# Patient Record
Sex: FEMALE | Race: BLACK OR AFRICAN AMERICAN | NOT HISPANIC OR LATINO | Employment: OTHER | RURAL
[De-identification: names, ages, dates, MRNs, and addresses within clinical notes are randomized per-mention and may not be internally consistent; named-entity substitution may affect disease eponyms.]

---

## 2020-06-17 ENCOUNTER — HISTORICAL (OUTPATIENT)
Dept: ADMINISTRATIVE | Facility: HOSPITAL | Age: 75
End: 2020-06-17

## 2021-07-26 ENCOUNTER — HOSPITAL ENCOUNTER (OUTPATIENT)
Dept: RADIOLOGY | Facility: HOSPITAL | Age: 76
Discharge: HOME | End: 2021-07-26
Payer: MEDICARE

## 2021-07-26 VITALS — BODY MASS INDEX: 23.32 KG/M2 | HEIGHT: 65 IN | WEIGHT: 140 LBS

## 2021-07-26 DIAGNOSIS — Z12.31 VISIT FOR SCREENING MAMMOGRAM: ICD-10-CM

## 2021-07-26 PROCEDURE — 77067 SCR MAMMO BI INCL CAD: CPT | Mod: TC

## 2021-07-26 PROCEDURE — 77067 MAMMO DIGITAL SCREENING BILAT: ICD-10-PCS | Mod: 26,,, | Performed by: RADIOLOGY

## 2021-07-26 PROCEDURE — 77067 SCR MAMMO BI INCL CAD: CPT | Mod: 26,,, | Performed by: RADIOLOGY

## 2021-09-13 ENCOUNTER — HOSPITAL ENCOUNTER (OUTPATIENT)
Dept: RADIOLOGY | Facility: HOSPITAL | Age: 76
Discharge: HOME | End: 2021-09-13
Attending: NURSE PRACTITIONER
Payer: MEDICARE

## 2021-09-13 DIAGNOSIS — R10.10 ACUTE UPPER ABDOMINAL PAIN: ICD-10-CM

## 2021-09-13 PROCEDURE — 74019 RADEX ABDOMEN 2 VIEWS: CPT | Mod: TC

## 2021-09-22 ENCOUNTER — HOSPITAL ENCOUNTER (OUTPATIENT)
Dept: RADIOLOGY | Facility: HOSPITAL | Age: 76
Discharge: HOME | End: 2021-09-22
Attending: NURSE PRACTITIONER
Payer: MEDICARE

## 2021-09-22 DIAGNOSIS — R51.9 HEAD ACHE: ICD-10-CM

## 2021-09-22 DIAGNOSIS — R10.10 UPPER ABDOMINAL PAIN, UNSPECIFIED: ICD-10-CM

## 2021-09-22 PROCEDURE — 70450 CT HEAD/BRAIN W/O DYE: CPT | Mod: TC

## 2021-09-22 PROCEDURE — 74176 CT ABD & PELVIS W/O CONTRAST: CPT | Mod: TC

## 2022-04-13 ENCOUNTER — HOSPITAL ENCOUNTER (OUTPATIENT)
Dept: RADIOLOGY | Facility: HOSPITAL | Age: 77
Discharge: HOME | End: 2022-04-13
Attending: NURSE PRACTITIONER
Payer: MEDICARE

## 2022-04-13 ENCOUNTER — OFFICE VISIT (OUTPATIENT)
Dept: VASCULAR SURGERY | Facility: CLINIC | Age: 77
End: 2022-04-13
Payer: MEDICARE

## 2022-04-13 VITALS
HEIGHT: 64 IN | HEART RATE: 72 BPM | WEIGHT: 149 LBS | SYSTOLIC BLOOD PRESSURE: 132 MMHG | RESPIRATION RATE: 12 BRPM | BODY MASS INDEX: 25.44 KG/M2 | DIASTOLIC BLOOD PRESSURE: 68 MMHG

## 2022-04-13 DIAGNOSIS — M79.604 PAIN IN BOTH LOWER EXTREMITIES: Primary | ICD-10-CM

## 2022-04-13 DIAGNOSIS — I87.2 CHRONIC VENOUS INSUFFICIENCY OF LOWER EXTREMITY: ICD-10-CM

## 2022-04-13 DIAGNOSIS — I83.811 VARICOSE VEINS OF RIGHT LOWER EXTREMITY WITH PAIN: ICD-10-CM

## 2022-04-13 DIAGNOSIS — M79.605 PAIN IN BOTH LOWER EXTREMITIES: Primary | ICD-10-CM

## 2022-04-13 DIAGNOSIS — R60.0 EDEMA OF BOTH LOWER EXTREMITIES: ICD-10-CM

## 2022-04-13 DIAGNOSIS — M79.604 PAIN IN BOTH LOWER EXTREMITIES: ICD-10-CM

## 2022-04-13 DIAGNOSIS — L81.9 HYPERPIGMENTATION OF SKIN: ICD-10-CM

## 2022-04-13 DIAGNOSIS — M79.605 PAIN IN BOTH LOWER EXTREMITIES: ICD-10-CM

## 2022-04-13 PROCEDURE — 99214 OFFICE O/P EST MOD 30 MIN: CPT | Mod: PBBFAC,25 | Performed by: NURSE PRACTITIONER

## 2022-04-13 PROCEDURE — 99204 PR OFFICE/OUTPT VISIT, NEW, LEVL IV, 45-59 MIN: ICD-10-PCS | Mod: S$PBB,,, | Performed by: NURSE PRACTITIONER

## 2022-04-13 PROCEDURE — 93970 US VENOUS REFLUX STUDY BILATERAL: ICD-10-PCS | Mod: 26,,, | Performed by: FAMILY MEDICINE

## 2022-04-13 PROCEDURE — 93970 EXTREMITY STUDY: CPT | Mod: TC

## 2022-04-13 PROCEDURE — 93970 EXTREMITY STUDY: CPT | Mod: 26,,, | Performed by: FAMILY MEDICINE

## 2022-04-13 PROCEDURE — 99204 OFFICE O/P NEW MOD 45 MIN: CPT | Mod: S$PBB,,, | Performed by: NURSE PRACTITIONER

## 2022-04-13 RX ORDER — HYDROCHLOROTHIAZIDE 12.5 MG/1
TABLET ORAL
COMMUNITY
Start: 2022-01-31

## 2022-04-13 RX ORDER — LISINOPRIL 30 MG/1
TABLET ORAL
COMMUNITY
Start: 2022-01-31

## 2022-04-13 RX ORDER — ASPIRIN 81 MG/1
81 TABLET ORAL DAILY
COMMUNITY

## 2022-04-13 RX ORDER — ATORVASTATIN CALCIUM 20 MG/1
TABLET, FILM COATED ORAL
COMMUNITY
Start: 2022-01-31

## 2022-04-13 RX ORDER — AMLODIPINE BESYLATE 5 MG/1
TABLET ORAL
COMMUNITY
Start: 2022-01-31

## 2022-04-13 RX ORDER — FLUTICASONE PROPIONATE 50 MCG
SPRAY, SUSPENSION (ML) NASAL
COMMUNITY
Start: 2022-01-31

## 2022-04-13 RX ORDER — GLYBURIDE 5 MG/1
5 TABLET ORAL
COMMUNITY

## 2022-04-13 RX ORDER — DEXTROMETHORPHAN HYDROBROMIDE, GUAIFENESIN 5; 100 MG/5ML; MG/5ML
650 LIQUID ORAL EVERY 8 HOURS
COMMUNITY

## 2022-04-13 NOTE — PROGRESS NOTES
[facility] CLINIC NOTE    Patient ID: Ibeth Pruitt is a 76 y.o. female.    I. HISTORY     Chief Complaint:   Chief Complaint   Patient presents with    Leg Pain     Room 2, new pt referred per self after vein screening, leg pain and VV        HPI: Ibeth Pruitt is a 76 y.o. female who is referred here today by self referral for evaluation of bilateral leg pain Rt. > Lt..  Patient was seen at a recent Vein screening and made the decision to f/u for further evaluation. Symptoms are as described in the chart below and began about one year ago.   Location is bilateral with Rt. > Lt. Symptoms are progressive at the end of the day.  History of venous interventions includes None.  No known family history of venous disease.  No history of DVT. Patient reports her right leg hurts all the time , even awakens her from sleep at night.     Venous Disease Medical Necessity Documentation Initial Visit Date: 4-13-22 Return Check Date:    1. Have you ever had a rupture or bleed from a varicose vein in your leg(s)?              [] Yes  [x] No   [] Yes   [] No   2. Have you ever been diagnosed with phlebitis, cellulitis, or inflammation in the area of the varicose veins of  your leg(s)?  [] Yes  [x] No    [] Yes   [] No   3. Do you have darkened or inflamed skin on your legs?   [x] Yes   [] No   [] Yes   [] No   4. Do you have leg swelling?     [x] Yes   [] No   [] Yes   [] No   5. Do you have leg pain?   [x] Yes   [] No   [] Yes   [] No   If yes, describe the type of pain?    [x]   Stabbing [x]  Radiating [x]  Aching   [x]  Tightness [x]  Throbbing               [x]  Burning [x]  Cramping              6. Do you have leg discomfort?   [] Yes   [] No   [] Yes   [] No   If yes, describe the type of discomfort?    [x]  Heaviness [x]  Fullness   [x]  Restlessness [x] Tired/Fatigued [] Itching              7. Have you ever worn compression hose?   [] Yes   [x] No   [] Yes   [] No   If yes, how long?           8. Do you elevate your legs  while sitting?   [x] Yes   [] No   [] Yes   [] No   9. Does venous disease (varicose veins, ulcers, skin changes, leg pain/swelling) interfere with your daily life?  If yes, check activities you are limited or unable to do.    []  Shower  [x]   Walk  [x]  Exercise  [] Play with children/grandchildren  []  Shop [] Work [x] Stand for any period of time [x] Sleep                               [x] Sitting for an extended period of time.           [x] Yes   [] No   [] Yes   [] No   10. Do you exercise/have you tried to exercise (i.e.  Walk our participate in a regular exercise routine)?  [x] Yes  [] No   [] Yes   [] No   11. BMI 25.6             Past Medical History:   Diagnosis Date    Essential (primary) hypertension     Low back pain 08/08/2018    Mixed hyperlipidemia     Spinal stenosis of lumbar region with neurogenic claudication 08/08/2018    Type 2 diabetes mellitus without complications         Past Surgical History:   Procedure Laterality Date    CHOLECYSTECTOMY      HYSTERECTOMY         Social History     Tobacco Use   Smoking Status Never Smoker   Smokeless Tobacco Never Used         Current Outpatient Medications:     acetaminophen (TYLENOL) 650 MG TbSR, Take 650 mg by mouth every 8 (eight) hours., Disp: , Rfl:     amLODIPine (NORVASC) 5 MG tablet, , Disp: , Rfl:     aspirin (ECOTRIN) 81 MG EC tablet, Take 81 mg by mouth once daily., Disp: , Rfl:     atorvastatin (LIPITOR) 20 MG tablet, , Disp: , Rfl:     fluticasone propionate (FLONASE) 50 mcg/actuation nasal spray, , Disp: , Rfl:     glyBURIDE (DIABETA) 5 MG tablet, Take 5 mg by mouth daily with breakfast., Disp: , Rfl:     hydroCHLOROthiazide (HYDRODIURIL) 12.5 MG Tab, , Disp: , Rfl:     lisinopriL (PRINIVIL,ZESTRIL) 30 MG tablet, , Disp: , Rfl:     mv-mn/iron/folic acid/herb 190 (VITAMIN D3 COMPLETE ORAL), Take by mouth., Disp: , Rfl:     Review of Systems   Constitutional: Negative for fatigue and fever.   Eyes: Negative for pain.    Respiratory: Negative for chest tightness and shortness of breath.    Cardiovascular: Positive for leg swelling. Negative for chest pain.   Gastrointestinal: Negative for abdominal pain.   Musculoskeletal: Positive for leg pain.        Chronic darker skin changes bilaterally   Neurological: Negative for syncope.   Psychiatric/Behavioral: Negative for sleep disturbance.          II. PHYSICAL EXAM     Physical Exam  Vitals reviewed.   Constitutional:       General: She is not in acute distress.     Appearance: She is normal weight.   HENT:      Head: Normocephalic.   Eyes:      Pupils: Pupils are equal, round, and reactive to light.   Cardiovascular:      Rate and Rhythm: Normal rate and regular rhythm.      Comments: Scattered Varicose on the right leg with scattered reticular and spider veins both legs.     Palpable DP's , biphasic dopplerable PT's  Pulmonary:      Effort: Pulmonary effort is normal.   Abdominal:      Palpations: Abdomen is soft.   Musculoskeletal:         General: No swelling. Normal range of motion.      Cervical back: Neck supple.      Right lower leg: Edema present.      Left lower leg: Edema present.      Comments: Mild swelling of BLE's , Lt. > Rt.    Skin:     General: Skin is warm and dry.      Comments: Hyperpigmentation changes noted of BLE's .    Neurological:      Mental Status: She is alert and oriented to person, place, and time.         Reticular/Spider veins noted:  RLE: anterior thigh  LLE: anterior thigh    Varicose veins noted:  RLE: at the knee  LLE:  none    CEAP Classification  Clinical Signs: Class 4 - Skin changes ascribed to venous disease  Etiologic Classification: Secondary  Anatomic distribution: Superficial  Pathophysiologic dysfunction: Reflux       Venous Clinical Severity Score  Pain:2=Daily, moderate activity limitation, occasional analgesics  Varicose Veins: 2=Multiple. GS varicose veins confined to calf or thigh  Venous Edema: 2=Afternoon edema, above  ankle  Pigmentation: 2=Diffuse over most of gaiter distribution (lower 1/3) or recent pigmentation (purple)  Inflammation: 0=None  Induration: 0=None  Number of Active Ulcers: 0=0  Active Ulceration, Duration: 0=None  Active Ulcer Size: 0=None  Compressive Therapy: 1=Intermittent use of stockings  Total Score: 9       III. ASSESSMENT & PLAN (MEDICAL DECISION MAKING)     1. Pain in both lower extremities    2. Edema of both lower extremities    3. Varicose veins of right lower extremity with pain    4. Hyperpigmentation of skin    5. Chronic venous insufficiency of lower extremity        Assessment/Diagnosis and Plan:  Patient has complaints, symptoms and physical exam findings of chronic venous disease.  Therefore, I will order a bilateral complete venous reflux study and see the patient back with results. Test results today showed reflux in bilateral GSV's Rt. > Lt. Study negative for DVT.  Thorough discussion of test findings and plan of care ; she voiced understanding.   Will start conservative management that consist of 20-30 mmHg compression, instructed on therapeutic leg elevation and calf pumping exercises.   FU in 3 months with Dr. Farr .     Orders Placed This Encounter    US Venous Reflux Study Bilateral          JONATHAN Haynes

## 2022-07-14 ENCOUNTER — OFFICE VISIT (OUTPATIENT)
Dept: VASCULAR SURGERY | Facility: CLINIC | Age: 77
End: 2022-07-14
Payer: MEDICARE

## 2022-07-14 VITALS
SYSTOLIC BLOOD PRESSURE: 112 MMHG | HEIGHT: 64 IN | HEART RATE: 76 BPM | WEIGHT: 141.81 LBS | DIASTOLIC BLOOD PRESSURE: 58 MMHG | RESPIRATION RATE: 14 BRPM | BODY MASS INDEX: 24.21 KG/M2

## 2022-07-14 DIAGNOSIS — I87.2 VENOUS INSUFFICIENCY: ICD-10-CM

## 2022-07-14 DIAGNOSIS — R60.0 EDEMA, LOWER EXTREMITY: ICD-10-CM

## 2022-07-14 DIAGNOSIS — L81.9 HYPERPIGMENTATION OF SKIN: Primary | ICD-10-CM

## 2022-07-14 DIAGNOSIS — M79.605 LEG PAIN, BILATERAL: ICD-10-CM

## 2022-07-14 DIAGNOSIS — M79.604 LEG PAIN, BILATERAL: ICD-10-CM

## 2022-07-14 PROCEDURE — 99214 PR OFFICE/OUTPT VISIT, EST, LEVL IV, 30-39 MIN: ICD-10-PCS | Mod: S$PBB,,, | Performed by: FAMILY MEDICINE

## 2022-07-14 PROCEDURE — 99214 OFFICE O/P EST MOD 30 MIN: CPT | Mod: PBBFAC | Performed by: FAMILY MEDICINE

## 2022-07-14 PROCEDURE — 99214 OFFICE O/P EST MOD 30 MIN: CPT | Mod: S$PBB,,, | Performed by: FAMILY MEDICINE

## 2022-07-14 NOTE — PROGRESS NOTES
[facility] CLINIC NOTE    Patient ID: Ibeth Pruitt is a 76 y.o. female.    I. HISTORY     Chief Complaint:   Chief Complaint   Patient presents with    Follow-up     Exam room 4.  3 months in compression.        HPI: Ibeth Pruitt is a 76 y.o. female who presents today after 3 months of conservative therapy for chronic venous insufficiency consisting of 20-30 mm Hg compression stockings, calf pumping exercises and therapeutic leg elevation.  The patient states that she has not worn compression every day, but the days that she does it helps her leg symptoms.  She does not have a lot of swelling, but instead has some aching/cramping of her bilateral calves.  Overall, she states she is doing better and denies life altering symptoms.  She would like to continue conservative measures at this time.    Venous Disease Medical Necessity Documentation Initial Visit Date: 4-13-22 Return Check Date:   07/14/2022   1. Have you ever had a rupture or bleed from a varicose vein in your leg(s)?              [] Yes  [x] No   [] Yes   [x] No   2. Have you ever been diagnosed with phlebitis, cellulitis, or inflammation in the area of the varicose veins of  your leg(s)?  [] Yes  [x] No    [] Yes   [x] No   3. Do you have darkened or inflamed skin on your legs?   [x] Yes   [] No   [x] Yes   [] No   4. Do you have leg swelling?     [x] Yes   [] No   [] Yes   [x] No   5. Do you have leg pain?   [x] Yes   [] No   [x] Yes   [] No   If yes, describe the type of pain?    [x]   Stabbing [x]  Radiating [x]  Aching   [x]  Tightness [x]  Throbbing               [x]  Burning [x]  Cramping          Cramping, tightness, aching   6. Do you have leg discomfort?   [] Yes   [] No   [x] Yes   [] No   If yes, describe the type of discomfort?    [x]  Heaviness [x]  Fullness   [x]  Restlessness [x] Tired/Fatigued [] Itching          Fullness, tired   7. Have you ever worn compression hose?   [] Yes   [x] No   [x] Yes   [] No   If yes, how long?        3  months   8. Do you elevate your legs while sitting?   [x] Yes   [] No   [x] Yes   [] No   9. Does venous disease (varicose veins, ulcers, skin changes, leg pain/swelling) interfere with your daily life?  If yes, check activities you are limited or unable to do.    []  Shower  [x]   Walk  [x]  Exercise  [] Play with children/grandchildren  []  Shop [] Work [x] Stand for any period of time [x] Sleep                               [x] Sitting for an extended period of time.           [x] Yes   [] No   [x] Yes   [] No  Walking, sleeping   10. Do you exercise/have you tried to exercise (i.e.  Walk our participate in a regular exercise routine)?  [x] Yes  [] No   [x] Yes   [] No   11. BMI 25.6   24.3          Past Medical History:   Diagnosis Date    Essential (primary) hypertension     Low back pain 08/08/2018    Mixed hyperlipidemia     Spinal stenosis of lumbar region with neurogenic claudication 08/08/2018    Type 2 diabetes mellitus without complications         Past Surgical History:   Procedure Laterality Date    CHOLECYSTECTOMY      HYSTERECTOMY         Social History     Tobacco Use   Smoking Status Never Smoker   Smokeless Tobacco Never Used         Current Outpatient Medications:     acetaminophen (TYLENOL) 650 MG TbSR, Take 650 mg by mouth every 8 (eight) hours., Disp: , Rfl:     amLODIPine (NORVASC) 5 MG tablet, , Disp: , Rfl:     aspirin (ECOTRIN) 81 MG EC tablet, Take 81 mg by mouth once daily., Disp: , Rfl:     atorvastatin (LIPITOR) 20 MG tablet, , Disp: , Rfl:     fluticasone propionate (FLONASE) 50 mcg/actuation nasal spray, , Disp: , Rfl:     glyBURIDE (DIABETA) 5 MG tablet, Take 5 mg by mouth daily with breakfast., Disp: , Rfl:     hydroCHLOROthiazide (HYDRODIURIL) 12.5 MG Tab, , Disp: , Rfl:     lisinopriL (PRINIVIL,ZESTRIL) 30 MG tablet, , Disp: , Rfl:     mv-mn/iron/folic acid/herb 190 (VITAMIN D3 COMPLETE ORAL), Take by mouth., Disp: , Rfl:     Review of Systems   Constitutional:  Negative for fatigue and fever.   Eyes: Negative for pain.   Respiratory: Negative for chest tightness and shortness of breath.    Cardiovascular: Positive for leg swelling. Negative for chest pain.   Gastrointestinal: Negative for abdominal pain.   Musculoskeletal: Positive for leg pain.        Chronic darker skin changes bilaterally   Neurological: Negative for syncope.   Psychiatric/Behavioral: Negative for sleep disturbance.          II. PHYSICAL EXAM     Physical Exam  Vitals reviewed.   Constitutional:       General: She is not in acute distress.     Appearance: She is normal weight.   HENT:      Head: Normocephalic.   Eyes:      Pupils: Pupils are equal, round, and reactive to light.   Cardiovascular:      Rate and Rhythm: Normal rate and regular rhythm.      Comments: Scattered Varicose on the right leg with scattered reticular and spider veins both legs.     Palpable DP's , biphasic dopplerable PT's  Pulmonary:      Effort: Pulmonary effort is normal.   Abdominal:      Palpations: Abdomen is soft.   Musculoskeletal:         General: No swelling. Normal range of motion.      Cervical back: Neck supple.      Right lower leg: Edema present.      Left lower leg: Edema present.      Comments: Mild swelling of BLE's , Lt. > Rt.    Skin:     General: Skin is warm and dry.      Comments: Hyperpigmentation changes noted of BLE's .    Neurological:      Mental Status: She is alert and oriented to person, place, and time.       Reticular/Spider veins noted:  RLE: anterior thigh  LLE: anterior thigh    Varicose veins noted:  RLE: at the knee  LLE:  none    CEAP Classification  Clinical Signs: Class 4 - Skin changes ascribed to venous disease  Etiologic Classification: Secondary  Anatomic distribution: Superficial  Pathophysiologic dysfunction: Reflux       Venous Clinical Severity Score  Pain:2=Daily, moderate activity limitation, occasional analgesics  Varicose Veins: 2=Multiple. GS varicose veins confined to calf or  thigh  Venous Edema: 2=Afternoon edema, above ankle  Pigmentation: 2=Diffuse over most of gaiter distribution (lower 1/3) or recent pigmentation (purple)  Inflammation: 0=None  Induration: 0=None  Number of Active Ulcers: 0=0  Active Ulceration, Duration: 0=None  Active Ulcer Size: 0=None  Compressive Therapy: 1=Intermittent use of stockings  Total Score: 9       III. ASSESSMENT & PLAN (MEDICAL DECISION MAKING)     1. Hyperpigmentation of skin    2. Edema, lower extremity    3. Venous insufficiency    4. Leg pain, bilateral        Assessment/Diagnosis and Plan:  The patient is doing well with conservative treatment at this time.  We will try switching her to 15-20 mmHg thigh-high compression stockings for better compliance.  I have encouraged her to wear these daily along with leg exercise and leg elevation.  Follow-up in 3 months for re-evaluation.          Cornelius Farr, DO

## 2022-07-14 NOTE — PROGRESS NOTES
[facility] CLINIC NOTE    Patient ID: Ibeth Pruitt is a 76 y.o. female.    I. HISTORY     Chief Complaint:   Chief Complaint   Patient presents with    Follow-up     Exam room 4.  3 months in compression.        HPI: Ibeth Pruitt is a 76 y.o. female who is referred here today by *** for evaluation of ***.  Symptoms are *** and began *** {days/wks/mos/yrs:410598} ago.  Location is {LEFT/RIGHT:57756} ***. Symptoms are *** at the end of the day.  History of venous interventions includes ***.  *** family history of venous disease.        Venous Disease Medical Necessity Documentation Initial Visit Date: Return Check Date:    1. Have you ever had a rupture or bleed from a varicose vein in your leg(s)?              [] Yes  [] No   [] Yes   [] No   2. Have you ever been diagnosed with phlebitis, cellulitis, or inflammation in the area of the varicose veins of  your leg(s)?  [] Yes  [] No    [] Yes   [] No   3. Do you have darkened or inflamed skin on your legs?   [] Yes   [] No   [] Yes   [] No   4. Do you have leg swelling?     [] Yes   [] No   [] Yes   [] No   5. Do you have leg pain?   [] Yes   [] No   [] Yes   [] No   If yes, describe the type of pain?    []   Stabbing []  Radiating []  Aching   []  Tightness []  Throbbing               []  Burning []  Cramping              6. Do you have leg discomfort?   [] Yes   [] No   [] Yes   [] No   If yes, describe the type of discomfort?    []  Heaviness []  Fullness   []  Restlessness [] Tired/Fatigued [] Itching              7. Have you ever worn compression hose?   [] Yes   [] No   [] Yes   [] No   If yes, how long?           8. Do you elevate your legs while sitting?   [] Yes   [] No   [] Yes   [] No   9. Does venous disease (varicose veins, ulcers, skin changes, leg pain/swelling) interfere with your daily life?  If yes, check activities you are limited or unable to do.    []  Shower  []   Walk  []  Exercise  [] Play with children/grandchildren  []  Shop [] Work []  Stand for any period of time [] Sleep                               [] Sitting for an extended period of time.           [] Yes   [] No   [] Yes   [] No   10. Do you exercise/have you tried to exercise (i.e.  Walk our participate in a regular exercise routine)?  [] Yes  [] No   [] Yes   [] No   11. BMI              Past Medical History:   Diagnosis Date    Essential (primary) hypertension     Low back pain 08/08/2018    Mixed hyperlipidemia     Spinal stenosis of lumbar region with neurogenic claudication 08/08/2018    Type 2 diabetes mellitus without complications         Past Surgical History:   Procedure Laterality Date    CHOLECYSTECTOMY      HYSTERECTOMY         Social History     Tobacco Use   Smoking Status Never Smoker   Smokeless Tobacco Never Used         Current Outpatient Medications:     acetaminophen (TYLENOL) 650 MG TbSR, Take 650 mg by mouth every 8 (eight) hours., Disp: , Rfl:     amLODIPine (NORVASC) 5 MG tablet, , Disp: , Rfl:     aspirin (ECOTRIN) 81 MG EC tablet, Take 81 mg by mouth once daily., Disp: , Rfl:     atorvastatin (LIPITOR) 20 MG tablet, , Disp: , Rfl:     fluticasone propionate (FLONASE) 50 mcg/actuation nasal spray, , Disp: , Rfl:     glyBURIDE (DIABETA) 5 MG tablet, Take 5 mg by mouth daily with breakfast., Disp: , Rfl:     hydroCHLOROthiazide (HYDRODIURIL) 12.5 MG Tab, , Disp: , Rfl:     lisinopriL (PRINIVIL,ZESTRIL) 30 MG tablet, , Disp: , Rfl:     mv-mn/iron/folic acid/herb 190 (VITAMIN D3 COMPLETE ORAL), Take by mouth., Disp: , Rfl:     Review of Systems       II. PHYSICAL EXAM     Physical Exam    Reticular/Spider veins noted:  RLE: {GRPVARICOSE:70775}  LLE: {GRPVARICOSE:38431}    Varicose veins noted:  RLE: {GRPVARICOSE:58638}  LLE:  {GRPVARICOSE:13102}    CEAP Classification     Venous Clinical Severity Score     III. ASSESSMENT & PLAN (MEDICAL DECISION MAKING)     No diagnosis found.    Assessment/Diagnosis and Plan:  Patient has complaints, symptoms and  physical exam findings of chronic venous disease.  Therefore, I will order a bilateral complete venous reflux study and see the patient back with results.            MICAELA CHEEK LPN

## 2022-08-17 ENCOUNTER — HOSPITAL ENCOUNTER (OUTPATIENT)
Dept: RADIOLOGY | Facility: HOSPITAL | Age: 77
Discharge: HOME | End: 2022-08-17
Attending: RADIOLOGY
Payer: MEDICARE

## 2022-08-17 DIAGNOSIS — Z12.31 VISIT FOR SCREENING MAMMOGRAM: ICD-10-CM

## 2022-08-17 PROCEDURE — 77067 SCR MAMMO BI INCL CAD: CPT | Mod: TC

## 2022-08-17 PROCEDURE — 77067 SCR MAMMO BI INCL CAD: CPT | Mod: 26,,, | Performed by: RADIOLOGY

## 2022-08-17 PROCEDURE — 77067 MAMMO DIGITAL SCREENING BILAT: ICD-10-PCS | Mod: 26,,, | Performed by: RADIOLOGY

## 2022-10-31 DIAGNOSIS — M54.50 LOW BACK PAIN: Primary | ICD-10-CM

## 2022-11-02 ENCOUNTER — CLINICAL SUPPORT (OUTPATIENT)
Dept: REHABILITATION | Facility: HOSPITAL | Age: 77
End: 2022-11-02
Payer: MEDICARE

## 2022-11-02 DIAGNOSIS — M54.50 CHRONIC BILATERAL LOW BACK PAIN WITHOUT SCIATICA: ICD-10-CM

## 2022-11-02 DIAGNOSIS — G89.29 CHRONIC BILATERAL LOW BACK PAIN WITHOUT SCIATICA: ICD-10-CM

## 2022-11-02 DIAGNOSIS — M54.50 LOW BACK PAIN: Primary | ICD-10-CM

## 2022-11-02 PROCEDURE — 97162 PT EVAL MOD COMPLEX 30 MIN: CPT

## 2022-11-02 NOTE — PLAN OF CARE
[facility]   Physical Therapy Initial Evaluation    Name: Ibeth Prutit  Clinic Number: [id number]    Therapy Diagnosis:   Encounter Diagnosis   Name Primary?    Low back pain Yes     Physician: Olesya Díaz FNP    Physician Orders: PT Eval and Treat   Medical Diagnosis from Referral: Low Back Pain  Evaluation Date: 11/2/2022  Plan of Care Expiration: 12/9/22  Visit # / Visits authorized: 10    Time In: 1455  Time Out: 1520    Precautions: Standard    Subjective   Date of onset: Unknown  History of current condition - IBETH reports: months and months of LBP that comes and goes. Pt reports having injections 4 or five years ago and takes pain medication for temporary relief but pain always returns.     Medical History:   Past Medical History:   Diagnosis Date    Essential (primary) hypertension     Low back pain 08/08/2018    Mixed hyperlipidemia     Spinal stenosis of lumbar region with neurogenic claudication 08/08/2018    Type 2 diabetes mellitus without complications        Surgical History:   Ibeth Pruitt  has a past surgical history that includes Hysterectomy; Cholecystectomy; and Oophorectomy.    Medications:   Ibeth has a current medication list which includes the following prescription(s): acetaminophen, amlodipine, aspirin, atorvastatin, fluticasone propionate, glyburide, hydrochlorothiazide, lisinopril, and mv-mn/iron/folic acid/herb 190.    Allergies:   Review of patient's allergies indicates:  No Known Allergies     Imaging, none:     Prior Therapy: years ago  Occupation: retired  Prior Level of Function: independent  Current Level of Function: independent    Pain:  Current 7/10, worst 10/10, best 4/10   Location: bilateral back   Description: Aching  Aggravating Factors: Sitting  Easing Factors: pain medication    Pts goals: pain relief.      Objective     I. Supine Tests:    LLD Negative right Negative left       Special test Right  Left    SLR test < 60 degrees Negative Negative   SLR  test > 60 degrees Negative Negative   Piriformis test Positive Positive   EDIS test Positive Positive   SI distraction Negative Negative   SI compression Negative Negative          MANUAL MUSCLE TEST  Right left   Hip flexion MMT number: 3+/5 MMT strength: 3+/5   Hip abduction MMT strength: 3+/5 MMT strength: 3+/5   Knee extension MMT strength: 5/5 MMT strength: 5/5   Knee flexion  MMT strength: 5/5 MMT strength: 5/5   Ankle dorsiflexion MMT strength: 5/5 MMT strength: 5/5   Ankle plantar flexion  MMT strength: 5/5 MMT strength: 5/5   Extensor hallucis longus MMT strength: 5/5 MMT strength: 5/5     ROM/flexibility right left   Hip flexion (120) 95 95   Internal rotation (45) 15 25   External rotation (45) 30 20   Hamstring 90/90 (-10) 20 25   Rectus femoris (120) 120 120               II. Sitting Tests:    Palpation: tender bilateral lumbar paraspinals    Sitting lordosis: Decreased  Sitting slump test Negative right  Negative left       III. Standing Tests:    Posture:  Standing lordosis: Decreased  Scoliosis: no  Lateral shift: no  Comments:    SI forward bend Negative Negative     SPINE AROM:    Thoraco-Lumbar Flexion: 50  Lumbar Extension: 10  Lumbar Side Bending: right 15 left 15  Trunk rotation: right 30 left 25      IV. Gait assessment:     Step length: WNL   Step width: WNL   Loly: WNL   Antalgic gait: no  Assistive device: none      Other test/information:             Assessment     Pt prognosis is Good.   Pt will benefit from skilled outpatient Physical Therapy to address the deficits stated above and in the chart below, provide pt/family education, and to maximize pt's level of independence.     Plan of care discussed with patient: Yes  Pt's spiritual, cultural and educational needs considered and patient is agreeable to the plan of care and goals as stated below:     Anticipated Barriers for therapy: yes      Goals:  Pt in agreement with goals to improve independent functional mobility and activity  tolerance.    Short Term Goals: 3 weeks   Pt in agreement with goals for pain management and to improve independent functional mobility and activity tolerance.    1. Patient will correctly demonstrate independent performance of Home Exercise Program in 1 week.  2. Patient will report decreased low back pain to 4/10 for standing, walking and bending activities.  3. Patient will increase hamstring flexibility 90/90 by 10 degrees to improve functional mobility.  4. Patient will increase lumbar ROM by 10 degrees to improve functional mobility.    Long Term Goals: 5 weeks   1. Patient will increase bilateral hip LE strength 4/5 to improve functional activity tolerance.  2. Patient will increase abdominal and back extensor strength to 3/5 to improve functional activity mobility for ADL's  3. Patient will tolerate 30 minutes or greater of therapeutic exercise with back pain no greater than 2/10 to improve functional activity tolerance.       Plan   Plan of care Certification: 11/2/2022 to 12/9/22.    Outpatient Physical Therapy 2 times weekly for 5 weeks to include the following interventions: Electrical Stimulation IFC, Manual Therapy, Moist Heat/ Ice, Patient Education, Therapeutic Exercise, and Ultrasound.     Supervised visit: Case conference with Hoda Constantino PTA and POC reviewed.     Saul Lyons, PT      Physician Signature:________________________________________________      Date:____________________________________________________________

## 2022-11-09 ENCOUNTER — CLINICAL SUPPORT (OUTPATIENT)
Dept: REHABILITATION | Facility: HOSPITAL | Age: 77
End: 2022-11-09
Payer: MEDICARE

## 2022-11-09 DIAGNOSIS — M54.50 CHRONIC BILATERAL LOW BACK PAIN WITHOUT SCIATICA: Primary | ICD-10-CM

## 2022-11-09 DIAGNOSIS — G89.29 CHRONIC BILATERAL LOW BACK PAIN WITHOUT SCIATICA: Primary | ICD-10-CM

## 2022-11-09 PROCEDURE — 97110 THERAPEUTIC EXERCISES: CPT | Mod: CQ

## 2022-11-09 NOTE — PROGRESS NOTES
Physical Therapy Daily Note     Name: Ibeth Pruitt  Clinic Number: [id number]  Diagnosis: Chronic Low back pain without sciatica   Encounter Diagnosis   Name Primary?    Low Back Pain Yes     Physician: Olesya Díaz FNP  Precautions: standard  Visit #: 2 of 10  PTA Visit #: 1  Time In: 1014  Time Out: 1050    Subjective     Pt reports: Pt reports her back is sore this morning.   Pain Scale: Ibeth rates pain on a scale of 0-10 to be 7 currently.    Objective     Ibeth received individual therapeutic exercises to develop strength, endurance, ROM, flexibility, and posture for 36 minutes including:  ScitFit lvl 1.5 x 6 min  Supine LTR x 2 min  Supine pelvic tilts 2 x 10   Supine bridges 3 x 10  Supine marches 2 x 10  Supine ball squeezes 2 x 10  Supine SLR 2 x 10  Seated R HS/calf stretch 2 x 30 sec         NOT THIS TX: The patient received the following supervised modalities after being cleared for contradictions: IFC Electrical Stimulation:  Ibeth received IFC Electrical Stimulation for pain control applied to the low back. Pt received stimulation at  100% scan at a frequency of 80/150 Hz for 15 minutes. Ibeth tolderated treatment well without any adverse effects.      Written Home Exercises Provided: completed and given to pt on this date.   Pt demo good understanding of the education provided. Ibeth demonstrated good return demonstration of activities.     Education provided re:  Ibeth verbalized good understanding of education provided.   No spiritual or educational barriers to learning provided    Assessment     Patient performed Skilled PT well on this date for low back with therapeutic rest breaks between each exercise. Pt c/o right hamstring stiffness throughout tx.     This is a 76 y.o. female referred to outpatient physical therapy and presents with a medical diagnosis of Low back pain and demonstrates limitations as described in the problem list. Pt  prognosis is Good. Pt will continue to benefit from skilled outpatient physical therapy to address the deficits listed in the problem list, provide pt/family education and to maximize pt's level of independence in the home and community environment.     Goals as follows:  Short Term Goals:   1. Patient will correctly demonstrate independent performance of Home Exercise Program in 1 week.  2. Patient will report decreased low back pain to 4/10 for standing, walking and bending activities.  3. Patient will increase hamstring flexibility 90/90 by 10 degrees to improve functional mobility.  4. Patient will increase lumbar ROM by 10 degrees to improve functional mobility.     Long Term Goals: 5 weeks   1. Patient will increase bilateral hip LE strength 4/5 to improve functional activity tolerance.  2. Patient will increase abdominal and back extensor strength to 3/5 to improve functional activity mobility for ADL's  3. Patient will tolerate 30 minutes or greater of therapeutic exercise with back pain no greater than 2/10 to improve functional activity tolerance.         Plan     Continue with established Plan of Care towards PT goals.    Therapist: Hoda Constantino, PTA  11/9/2022

## 2022-11-14 ENCOUNTER — CLINICAL SUPPORT (OUTPATIENT)
Dept: REHABILITATION | Facility: HOSPITAL | Age: 77
End: 2022-11-14
Payer: MEDICARE

## 2022-11-14 DIAGNOSIS — G89.29 CHRONIC BILATERAL LOW BACK PAIN WITHOUT SCIATICA: Primary | ICD-10-CM

## 2022-11-14 DIAGNOSIS — M54.50 CHRONIC BILATERAL LOW BACK PAIN WITHOUT SCIATICA: Primary | ICD-10-CM

## 2022-11-14 PROCEDURE — 97110 THERAPEUTIC EXERCISES: CPT | Mod: CQ

## 2022-11-14 NOTE — PROGRESS NOTES
Physical Therapy Daily Note     Name: Ibeth Pruitt  Clinic Number: [id number]  Diagnosis: Chronic Low back pain without sciatica   Encounter Diagnosis   Name Primary?    Low Back Pain Yes     Physician: Olesya Díaz FNP  Precautions: standard  Visit #: 3 of 10  PTA Visit #: 2  Time In: 1021  Time Out: 1100    Subjective     Pt reports: Pt reports her low back is a little sore this morning.   Pain Scale: Ibeth rates pain on a scale of 0-10 to be 5 currently.    Objective     Ibeth received individual therapeutic exercises to develop strength, endurance, ROM, flexibility, and posture for 39 minutes including:  ScitFit lvl 2.0 x 6.5 min  Supine LTR x 2 min  Supine pelvic tilts 2 x 10   Supine bridges 2 x 10  Supine marches 2 x 10  Supine ball squeezes 2 x 10  Supine B SLR 2 x 10  SKTC 5 x 10 s/h  Heel raises 2 x 10  Standing R HS curl 2 x 10       NOT THIS TX: The patient received the following supervised modalities after being cleared for contradictions: IFC Electrical Stimulation:  Ibeth received IFC Electrical Stimulation for pain control applied to the low back. Pt received stimulation at 100% scan at a frequency of 80/150 Hz for 15 minutes. Ibeth tolderated treatment well without any adverse effects.      Written Home Exercises Provided: completed.   Pt demo good understanding of the education provided. Ibeth demonstrated good return demonstration of activities.     Education provided re:  Ibeth verbalized good understanding of education provided.   No spiritual or educational barriers to learning provided    Assessment     Patient still c/o right leg stiffness and difficulty bending right LE during supine exercises for low back.      This is a 76 y.o. female referred to outpatient physical therapy and presents with a medical diagnosis of Low back pain and demonstrates limitations as described in the problem list. Pt prognosis is Good. Pt will continue to  benefit from skilled outpatient physical therapy to address the deficits listed in the problem list, provide pt/family education and to maximize pt's level of independence in the home and community environment.     Goals as follows:  Short Term Goals:   1. Patient will correctly demonstrate independent performance of Home Exercise Program in 1 week.  2. Patient will report decreased low back pain to 4/10 for standing, walking and bending activities.  3. Patient will increase hamstring flexibility 90/90 by 10 degrees to improve functional mobility.  4. Patient will increase lumbar ROM by 10 degrees to improve functional mobility.     Long Term Goals: 5 weeks   1. Patient will increase bilateral hip LE strength 4/5 to improve functional activity tolerance.  2. Patient will increase abdominal and back extensor strength to 3/5 to improve functional activity mobility for ADL's  3. Patient will tolerate 30 minutes or greater of therapeutic exercise with back pain no greater than 2/10 to improve functional activity tolerance.         Plan     Continue with established Plan of Care towards PT goals.    Therapist: Hoda Constantino, PTA  11/14/2022

## 2022-11-16 ENCOUNTER — CLINICAL SUPPORT (OUTPATIENT)
Dept: REHABILITATION | Facility: HOSPITAL | Age: 77
End: 2022-11-16
Payer: MEDICARE

## 2022-11-16 DIAGNOSIS — G89.29 CHRONIC BILATERAL LOW BACK PAIN WITHOUT SCIATICA: Primary | ICD-10-CM

## 2022-11-16 DIAGNOSIS — M54.50 CHRONIC BILATERAL LOW BACK PAIN WITHOUT SCIATICA: Primary | ICD-10-CM

## 2022-11-16 PROCEDURE — 97110 THERAPEUTIC EXERCISES: CPT | Mod: CQ

## 2022-11-16 NOTE — PROGRESS NOTES
Physical Therapy Daily Note     Name: bIeth Pruitt  Clinic Number: [id number]  Diagnosis: Chronic Low back pain without sciatica   Encounter Diagnosis   Name Primary?    Low Back Pain Yes     Physician: Olesya Díaz FNP  Precautions: standard  Visit #: 4 of 10  PTA Visit #: 3  Time In: 1021  Time Out: 1059    Subjective     Pt reports: No new complaints.   Pain Scale: Ibeth rates pain on a scale of 0-10 to be 2 currently.    Objective     Ibeth received individual therapeutic exercises to develop strength, endurance, ROM, flexibility, and posture for 38 minutes including:  ScitFit lvl 2.0 x 5 min  Supine LTR x 2 min  Clamshells red tband 2 x 10   Supine pelvic tilts 2 x 10   Supine bridges 2 x 10  Supine marches 2 x 10  Supine ball squeezes 2 x 10  Supine B SLR 2 x 10  SKTC 5 x 10 s/h  Heel raises 2 x 10  Standing B HS curl 2 x 10       NOT THIS TX: The patient received the following supervised modalities after being cleared for contradictions: IFC Electrical Stimulation:  Ibeth received IFC Electrical Stimulation for pain control applied to the low back. Pt received stimulation at 100% scan at a frequency of 80/150 Hz for 15 minutes. Ibeth tolderated treatment well without any adverse effects.      Written Home Exercises Provided: completed.   Pt demo good understanding of the education provided. Ibeth demonstrated good return demonstration of activities.     Education provided re:  Ibeth verbalized good understanding of education provided.   No spiritual or educational barriers to learning provided    Assessment     Patient still progressing towards meeting all ST and LT goals with PT. Pt able to tolerate new exercise well without increased pain.     This is a 76 y.o. female referred to outpatient physical therapy and presents with a medical diagnosis of Low back pain and demonstrates limitations as described in the problem list. Pt prognosis is Good. Pt  will continue to benefit from skilled outpatient physical therapy to address the deficits listed in the problem list, provide pt/family education and to maximize pt's level of independence in the home and community environment.     Goals as follows:  Short Term Goals:   1. Patient will correctly demonstrate independent performance of Home Exercise Program in 1 week. Goal Met.  2. Patient will report decreased low back pain to 4/10 for standing, walking and bending activities. 5/10 on this date.  3. Patient will increase hamstring flexibility 90/90 by 10 degrees to improve functional mobility.  4. Patient will increase lumbar ROM by 10 degrees to improve functional mobility.     Long Term Goals: 5 weeks   1. Patient will increase bilateral hip LE strength 4/5 to improve functional activity tolerance.  2. Patient will increase abdominal and back extensor strength to 3/5 to improve functional activity mobility for ADL's  3. Patient will tolerate 30 minutes or greater of therapeutic exercise with back pain no greater than 2/10 to improve functional activity tolerance.         Plan     Continue with established Plan of Care towards PT goals.    Therapist: Hoda Constantino, PTA  11/16/2022

## 2022-11-21 ENCOUNTER — CLINICAL SUPPORT (OUTPATIENT)
Dept: REHABILITATION | Facility: HOSPITAL | Age: 77
End: 2022-11-21
Payer: MEDICARE

## 2022-11-21 DIAGNOSIS — G89.29 CHRONIC BILATERAL LOW BACK PAIN WITHOUT SCIATICA: Primary | ICD-10-CM

## 2022-11-21 DIAGNOSIS — M54.50 CHRONIC BILATERAL LOW BACK PAIN WITHOUT SCIATICA: Primary | ICD-10-CM

## 2022-11-21 PROCEDURE — 97110 THERAPEUTIC EXERCISES: CPT | Mod: CQ

## 2022-11-21 PROCEDURE — 97530 THERAPEUTIC ACTIVITIES: CPT | Mod: CQ

## 2022-11-21 NOTE — PROGRESS NOTES
Physical Therapy Daily Note     Name: Ibeth Pruitt  Clinic Number: [id number]  Diagnosis: Chronic Low back pain without sciatica   Encounter Diagnosis   Name Primary?    Low Back Pain Yes     Physician: Olesya Díaz FNP  Precautions: standard  Visit #: 5 of 10  PTA Visit #: 4  Time In: 1014  Time Out: 1058    Subjective     Pt reports: No new complaints. Denies current pain at start of visit.  Pain Scale: Ibeth rates pain on a scale of 0-10 to be 2 currently.    Objective     Ibeth received individual therapeutic exercises to develop strength, endurance, ROM, flexibility, and posture for 38 minutes including:  ScitFit lvl 2.5 x 5 min  Supine LTR x 2 min  Clamshells  red tband 2 x Bilateral in SL   Supine pelvic tilts 2 x 10   Supine bridges 2 x 10  Supine marches 2 x 10 with 1.5# B  Supine ball squeezes 10 x 10 seconds  Supine B SLR 2 x 10  SKTC 5 x 10 s/h  Heel raises 2 x 10  Standing B HS curl 2 x 10       NOT THIS TX: The patient received the following supervised modalities after being cleared for contradictions: IFC Electrical Stimulation:  Ibeth received IFC Electrical Stimulation for pain control applied to the low back. Pt received stimulation at 100% scan at a frequency of 80/150 Hz for 15 minutes. Ibeth tolderated treatment well without any adverse effects.     Written Home Exercises Provided: completed.   Pt demo good understanding of the education provided. Ibeth demonstrated good return demonstration of activities.     Education provided re:  Ibeth verbalized good understanding of education provided.   No spiritual or educational barriers to learning provided    Assessment     Patient still progressing towards meeting all ST and LT goals with PT. Pt able to tolerate new exercise well without increased pain.     This is a 76 y.o. female referred to outpatient physical therapy and presents with a medical diagnosis of Low back pain and demonstrates  limitations as described in the problem list. Pt prognosis is Good. Pt will continue to benefit from skilled outpatient physical therapy to address the deficits listed in the problem list, provide pt/family education and to maximize pt's level of independence in the home and community environment.     Goals as follows:  Short Term Goals:   1. Patient will correctly demonstrate independent performance of Home Exercise Program in 1 week. Goal Met.  2. Patient will report decreased low back pain to 4/10 for standing, walking and bending activities. 5/10 on this date.  3. Patient will increase hamstring flexibility 90/90 by 10 degrees to improve functional mobility.  4. Patient will increase lumbar ROM by 10 degrees to improve functional mobility.     Long Term Goals: 5 weeks   1. Patient will increase bilateral hip LE strength 4/5 to improve functional activity tolerance.  2. Patient will increase abdominal and back extensor strength to 3/5 to improve functional activity mobility for ADL's  3. Patient will tolerate 30 minutes or greater of therapeutic exercise with back pain no greater than 2/10 to improve functional activity tolerance.         Plan     Continue with established Plan of Care towards PT goals.    Therapist: Makenna Donato, PTA  11/21/2022

## 2022-11-28 ENCOUNTER — CLINICAL SUPPORT (OUTPATIENT)
Dept: REHABILITATION | Facility: HOSPITAL | Age: 77
End: 2022-11-28
Payer: MEDICARE

## 2022-11-28 DIAGNOSIS — M54.50 CHRONIC BILATERAL LOW BACK PAIN WITHOUT SCIATICA: Primary | ICD-10-CM

## 2022-11-28 DIAGNOSIS — G89.29 CHRONIC BILATERAL LOW BACK PAIN WITHOUT SCIATICA: Primary | ICD-10-CM

## 2022-11-28 PROCEDURE — 97110 THERAPEUTIC EXERCISES: CPT | Mod: CQ

## 2022-11-28 NOTE — PROGRESS NOTES
"                                                      Physical Therapy Daily Note     Name: Ibeth Pruitt  Clinic Number: [id number]  Diagnosis: Chronic Low back pain without sciatica   Encounter Diagnosis   Name Primary?    Low Back Pain Yes     Physician: Olesya Díaz FNP  Precautions: standard  Visit #: 6 of 10  PTA Visit #: 5  Time In: 1017  Time Out: 1100    Subjective     Pt reports: Pt reports no pain just stiffness stating, "It's just old age."   Pain Scale: Ibeth rates pain on a scale of 0-10 to be 0 currently.    Objective     Ibeth received individual therapeutic exercises to develop strength, endurance, ROM, flexibility, and posture for 43 minutes including:    ScitFit lvl 2.0 x 6 min  Supine LTR x 2 min  Clamshells gr tband 2 x 10   Supine pelvic tilts 2 x 15   Supine bridges 2 x 15  Supine marches 1.5# 2 x 15  Supine ball squeezes 2 x 15  Supine B SLR 2 x 15  SKTC 5 x 10 s/h  Heel raises 2 x 15  Standing B HS curl 1.5# 2 x 10  Seated marches 2 x 10 1.5#       11/28/22  MANUAL MUSCLE TEST  Right left   Hip flexion MMT number: 3+/5 MMT strength: 4/5   Hip abduction MMT strength: 3+/5 MMT strength: 3+/5   Knee extension MMT strength: 5/5 MMT strength: 5/5   Knee flexion  MMT strength: 5/5 MMT strength: 5/5   Ankle dorsiflexion MMT strength: 5/5 MMT strength: 5/5   Ankle plantar flexion  MMT strength: 5/5 MMT strength: 5/5   Extensor hallucis longus MMT strength: 5/5 MMT strength: 5/5       90/90 HS Flexibility: Left  -11  ; Right  -12      Written Home Exercises Provided: completed.   Pt demo good understanding of the education provided. Ibeth demonstrated good return demonstration of activities.     Education provided re:  Ibeth verbalized good understanding of education provided.   No spiritual or educational barriers to learning provided    Assessment     Patient progressing well towards ST goals at this time. Pt able to tolerate increase in reps without difficulty.     This is a 77 y.o. female " referred to outpatient physical therapy and presents with a medical diagnosis of Low back pain and demonstrates limitations as described in the problem list. Pt prognosis is Good. Pt will continue to benefit from skilled outpatient physical therapy to address the deficits listed in the problem list, provide pt/family education and to maximize pt's level of independence in the home and community environment.     Goals as follows:  Short Term Goals:   1. Patient will correctly demonstrate independent performance of Home Exercise Program in 1 week. Goal Met.  2. Patient will report decreased low back pain to 4/10 for standing, walking and bending activities.Goal Met.  3. Patient will increase hamstring flexibility 90/90 by 10 degrees to improve functional mobility. Goal Met for Right HS.  4. Patient will increase lumbar ROM by 10 degrees to improve functional mobility.     Long Term Goals: 5 weeks   1. Patient will increase bilateral hip LE strength 4/5 to improve functional activity tolerance. In progress   2. Patient will increase abdominal and back extensor strength to 3/5 to improve functional activity mobility for ADL's  3. Patient will tolerate 30 minutes or greater of therapeutic exercise with back pain no greater than 2/10 to improve functional activity tolerance. Goal Met.        Plan     Continue with established Plan of Care towards PT goals.    Therapist: Hoda Constantino, PTA  11/28/2022

## 2022-11-30 ENCOUNTER — CLINICAL SUPPORT (OUTPATIENT)
Dept: REHABILITATION | Facility: HOSPITAL | Age: 77
End: 2022-11-30
Payer: MEDICARE

## 2022-11-30 DIAGNOSIS — G89.29 CHRONIC BILATERAL LOW BACK PAIN WITHOUT SCIATICA: Primary | ICD-10-CM

## 2022-11-30 DIAGNOSIS — M54.50 CHRONIC BILATERAL LOW BACK PAIN WITHOUT SCIATICA: Primary | ICD-10-CM

## 2022-11-30 PROCEDURE — 97110 THERAPEUTIC EXERCISES: CPT | Mod: CQ

## 2022-11-30 NOTE — PROGRESS NOTES
Physical Therapy Daily Note     Name: Ibeth Pruitt  Clinic Number: [id number]  Diagnosis: Chronic Low back pain without sciatica   Encounter Diagnosis   Name Primary?    Low Back Pain Yes     Physician: Olesya Díaz FNP  Precautions: standard  Visit #: 7 of 10  PTA Visit #: 6  Time In: 1016  Time Out: 1100    Subjective     Pt reports: Pt still reporting stiffness.    Pain Scale: Ibeth rates pain on a scale of 0-10 to be 0 currently.    Objective     Ibeth received individual therapeutic exercises to develop strength, endurance, ROM, flexibility, and posture for 44 minutes including:    ScitFit lvl 2.0 x 6 min  Supine LTR x 2 min  Clamshells gr tband 2 x 15  Supine pelvic tilts 2 x 15   Supine bridges 2 x 15  Supine marches 1.5# 2 x 15  Supine ball squeezes 2 x 15  Supine B SLR 1.5# 2 x 10  SL hip abd 1.5# 2 x 10   SKTC 10 x 10 s/h  Heel raises 2 x 10  Standing B HS curl 1.5# 2 x 10      11/28/22  MANUAL MUSCLE TEST  Right left   Hip flexion MMT number: 3+/5 MMT strength: 4/5   Hip abduction MMT strength: 3+/5 MMT strength: 3+/5   Knee extension MMT strength: 5/5 MMT strength: 5/5   Knee flexion  MMT strength: 5/5 MMT strength: 5/5   Ankle dorsiflexion MMT strength: 5/5 MMT strength: 5/5   Ankle plantar flexion  MMT strength: 5/5 MMT strength: 5/5   Extensor hallucis longus MMT strength: 5/5 MMT strength: 5/5       90/90 HS Flexibility: Left  -11 ; Right  -12      Written Home Exercises Provided: completed.   Pt demo good understanding of the education provided. Ibeth demonstrated good return demonstration of activities.     Education provided re:  Ibeth verbalized good understanding of education provided.   No spiritual or educational barriers to learning provided    Assessment     Patient progressing well with PT at this time.    This is a 77 y.o. female referred to outpatient physical therapy and presents with a medical diagnosis of Low back pain and  demonstrates limitations as described in the problem list. Pt prognosis is Good. Pt will continue to benefit from skilled outpatient physical therapy to address the deficits listed in the problem list, provide pt/family education and to maximize pt's level of independence in the home and community environment.     Goals as follows:  Short Term Goals:   1. Patient will correctly demonstrate independent performance of Home Exercise Program in 1 week. Goal Met.  2. Patient will report decreased low back pain to 4/10 for standing, walking and bending activities.Goal Met.  3. Patient will increase hamstring flexibility 90/90 by 10 degrees to improve functional mobility. Goal Met for Right HS.  4. Patient will increase lumbar ROM by 10 degrees to improve functional mobility.     Long Term Goals: 5 weeks   1. Patient will increase bilateral hip LE strength 4/5 to improve functional activity tolerance. In progress   2. Patient will increase abdominal and back extensor strength to 3/5 to improve functional activity mobility for ADL's  3. Patient will tolerate 30 minutes or greater of therapeutic exercise with back pain no greater than 2/10 to improve functional activity tolerance. Goal Met.        Plan     Continue with established Plan of Care towards PT goals.    Therapist: Hoda Constantino, PTA  11/30/2022

## 2022-12-05 ENCOUNTER — CLINICAL SUPPORT (OUTPATIENT)
Dept: REHABILITATION | Facility: HOSPITAL | Age: 77
End: 2022-12-05
Payer: MEDICARE

## 2022-12-05 DIAGNOSIS — G89.29 CHRONIC BILATERAL LOW BACK PAIN WITHOUT SCIATICA: Primary | ICD-10-CM

## 2022-12-05 DIAGNOSIS — M54.50 CHRONIC BILATERAL LOW BACK PAIN WITHOUT SCIATICA: Primary | ICD-10-CM

## 2022-12-05 PROCEDURE — 97110 THERAPEUTIC EXERCISES: CPT

## 2022-12-05 NOTE — PROGRESS NOTES
Physical Therapy Daily Note     Name: Ibeth Pruitt  Clinic Number: [id number]  Diagnosis: Chronic Low back pain without sciatica   Encounter Diagnosis   Name Primary?    Low Back Pain Yes     Physician: Olesya Díaz FNP  Precautions: standard  Visit #: 8 of 10  PTA Visit #: 6  Time In: 1030  Time Out: 1108    Subjective     Pt reports: Pt still reporting stiffness.    Pain Scale: Ibeth rates pain on a scale of 0-10 to be 0 currently.    Objective     Ibeth received individual therapeutic exercises to develop strength, endurance, ROM, flexibility, and posture for 38 minutes including:    ScitFit lvl 2.0 x 6 min  Supine LTR x 2 min  Clamshells gr tband 2 x 15  Supine pelvic tilts 2 x 15   Supine bridges 2 x 15  Seated  marches 1.5# 2 x 15  seated ball squeezes 2 x 15  Not this visit-Supine B SLR 1.5# 2 x 10  Standing SLR and hip abd 1.5# 2 x 15  Not this visit-SKTC 10 x 10 s/h  Heel raises 2 x 15  Standing B HS curl 1.5# 2 x 10      11/28/22  MANUAL MUSCLE TEST  Right left   Hip flexion MMT number: 3+/5 MMT strength: 4/5   Hip abduction MMT strength: 3+/5 MMT strength: 3+/5   Knee extension MMT strength: 5/5 MMT strength: 5/5   Knee flexion  MMT strength: 5/5 MMT strength: 5/5   Ankle dorsiflexion MMT strength: 5/5 MMT strength: 5/5   Ankle plantar flexion  MMT strength: 5/5 MMT strength: 5/5   Extensor hallucis longus MMT strength: 5/5 MMT strength: 5/5       90/90 HS Flexibility: Left  -11 ; Right  -12    12/5/22  SPINE AROM:  Thoraco-Lumbar Flexion: 60  Lumbar Extension: 25  Lumbar Side Bending: right 18 left 20  Trunk rotation: right 35 left 35      Written Home Exercises Provided: completed.        Education provided re:  Ibeth received verbal and tactile cues to facilitate proper execution of exercises and body mechanics.   No spiritual or educational barriers to learning.    Assessment     Patient reported B LE muscle cramps during supine exercises,  otherwise tolerated treatment well. Good improvement in spine ROM measurements.    This is a 77 y.o. female referred to outpatient physical therapy and presents with a medical diagnosis of Low back pain and demonstrates limitations as described in the problem list. Pt prognosis is Good. Pt will continue to benefit from skilled outpatient physical therapy to address the deficits listed in the problem list, provide pt/family education and to maximize pt's level of independence in the home and community environment.     Goals as follows:  Short Term Goals:   1. Patient will correctly demonstrate independent performance of Home Exercise Program in 1 week. Goal Met.  2. Patient will report decreased low back pain to 4/10 for standing, walking and bending activities.Goal Met.  3. Patient will increase hamstring flexibility 90/90 by 10 degrees to improve functional mobility. Goal Met for Right HS.  4. Patient will increase lumbar ROM by 10 degrees to improve functional mobility. Goal Met.     Long Term Goals: 5 weeks   1. Patient will increase bilateral hip LE strength 4/5 to improve functional activity tolerance. In progress   2. Patient will increase abdominal and back extensor strength to 3/5 to improve functional activity mobility for ADL's  3. Patient will tolerate 30 minutes or greater of therapeutic exercise with back pain no greater than 2/10 to improve functional activity tolerance. Goal Met.        Plan     Continue with established Plan of Care towards PT goals.    Therapist: Saul Lyons, PT  12/5/2022

## 2022-12-07 ENCOUNTER — CLINICAL SUPPORT (OUTPATIENT)
Dept: REHABILITATION | Facility: HOSPITAL | Age: 77
End: 2022-12-07
Payer: MEDICARE

## 2022-12-07 DIAGNOSIS — G89.29 CHRONIC BILATERAL LOW BACK PAIN WITHOUT SCIATICA: Primary | ICD-10-CM

## 2022-12-07 DIAGNOSIS — M54.50 CHRONIC BILATERAL LOW BACK PAIN WITHOUT SCIATICA: Primary | ICD-10-CM

## 2022-12-07 PROCEDURE — 97110 THERAPEUTIC EXERCISES: CPT | Mod: CQ

## 2022-12-07 NOTE — PROGRESS NOTES
Physical Therapy Daily Note     Name: Ibeth Pruitt  Clinic Number: [id number]  Diagnosis: Chronic Low back pain without sciatica   Encounter Diagnosis   Name Primary?    Low Back Pain Yes     Physician: Olesya Díaz FNP  Precautions: standard  Visit #: 9 of 10  PTA Visit #: 7  Time In: 1020  Time Out: 1100    Subjective     Pt reports: Pt still reporting stiffness.    Pain Scale: Ibeth rates pain on a scale of 0-10 to be 0 currently.    Objective     Ibeth received individual therapeutic exercises to develop strength, endurance, ROM, flexibility, and posture for 40 minutes including:    ScitFit lvl 2.0 x 6 min  Supine LTR x 1.5 min  Clamshells gr tband 2 x 15  Supine pelvic tilts 2 x 1 min  Supine bridges 2 x 1 min  Supine marches 2# 2 x 10   Seated ball squeezes x 3 min   DKTC 10 x 10 s/h  Standing SLR and hip abd 2# 2 x 10  Heel raises 2 x 15  Standing B HS curl 2# 2 x 10      11/28/22  MANUAL MUSCLE TEST  Right left   Hip flexion MMT number: 3+/5 MMT strength: 4/5   Hip abduction MMT strength: 3+/5 MMT strength: 3+/5   Knee extension MMT strength: 5/5 MMT strength: 5/5   Knee flexion  MMT strength: 5/5 MMT strength: 5/5   Ankle dorsiflexion MMT strength: 5/5 MMT strength: 5/5   Ankle plantar flexion  MMT strength: 5/5 MMT strength: 5/5   Extensor hallucis longus MMT strength: 5/5 MMT strength: 5/5       90/90 HS Flexibility: Left  -11 ; Right  -12    12/5/22  SPINE AROM:  Thoraco-Lumbar Flexion: 60  Lumbar Extension: 25  Lumbar Side Bending: right 18 left 20  Trunk rotation: right 35 left 35      Written Home Exercises Provided: completed.        Education provided re:  Ibeth received verbal and tactile cues to facilitate proper execution of exercises and body mechanics.   No spiritual or educational barriers to learning.    Assessment     Patient tolerated increase in ankle weight for standing exercises well without difficulty noted. Therapeutic rest breaks  were required with supine exercises.    This is a 77 y.o. female referred to outpatient physical therapy and presents with a medical diagnosis of Low back pain and demonstrates limitations as described in the problem list. Pt prognosis is Good. Pt will continue to benefit from skilled outpatient physical therapy to address the deficits listed in the problem list, provide pt/family education and to maximize pt's level of independence in the home and community environment.     Goals as follows:  Short Term Goals:   1. Patient will correctly demonstrate independent performance of Home Exercise Program in 1 week. Goal Met.  2. Patient will report decreased low back pain to 4/10 for standing, walking and bending activities.Goal Met.  3. Patient will increase hamstring flexibility 90/90 by 10 degrees to improve functional mobility. Goal Met for Right HS.  4. Patient will increase lumbar ROM by 10 degrees to improve functional mobility. Goal Met.     Long Term Goals: 5 weeks   1. Patient will increase bilateral hip LE strength 4/5 to improve functional activity tolerance. In progress   2. Patient will increase abdominal and back extensor strength to 3/5 to improve functional activity mobility for ADL's  3. Patient will tolerate 30 minutes or greater of therapeutic exercise with back pain no greater than 2/10 to improve functional activity tolerance. Goal Met.        Plan     Continue with established Plan of Care towards PT goals.    Therapist: Hoda Constantino, ANGEL  12/7/2022

## 2022-12-12 ENCOUNTER — CLINICAL SUPPORT (OUTPATIENT)
Dept: REHABILITATION | Facility: HOSPITAL | Age: 77
End: 2022-12-12
Payer: MEDICARE

## 2022-12-12 DIAGNOSIS — M54.50 CHRONIC BILATERAL LOW BACK PAIN WITHOUT SCIATICA: Primary | ICD-10-CM

## 2022-12-12 DIAGNOSIS — G89.29 CHRONIC BILATERAL LOW BACK PAIN WITHOUT SCIATICA: Primary | ICD-10-CM

## 2022-12-12 PROCEDURE — 97110 THERAPEUTIC EXERCISES: CPT

## 2022-12-12 NOTE — PROGRESS NOTES
Physical Therapy Daily Note     Name: Ibeth Pruitt  Clinic Number: [id number]  Diagnosis: Chronic Low back pain without sciatica   Encounter Diagnosis   Name Primary?    Low Back Pain Yes     Physician: Olesya Díaz FNP  Precautions: standard  Visit #: 10 of 10  PTA Visit #: 7  Time In: 1030  Time Out: 1055    Subjective     Pt reports: Pt reports no complaints today.  Pain Scale: Ibeth rates pain on a scale of 0-10 to be 0 currently.    Objective     Ibeth received individual therapeutic exercises to develop strength, endurance, ROM, flexibility, and posture for 25 minutes including:    ScitFit lvl 2.5 x 6 min  Supine pelvic tilts 1 x 1 min  Supine bridges 1 x 1 min  Supine double knee marches 2 x 10   Seated ball squeezes x 3 min   Standing Hip flexion, extension and hip abd 2.5# 1 x 15        12/12/22  MANUAL MUSCLE TEST  Right left   Hip flexion MMT number: 4/5 MMT strength: 4/5   Hip abduction MMT strength: 4/5 MMT strength: 4/5   Knee extension MMT strength: 5/5 MMT strength: 5/5   Knee flexion  MMT strength: 5/5 MMT strength: 5/5   Ankle dorsiflexion MMT strength: 5/5 MMT strength: 5/5   Ankle plantar flexion  MMT strength: 5/5 MMT strength: 5/5   Extensor hallucis longus MMT strength: 5/5 MMT strength: 5/5     12/12/22  90/90 HS Flexibility: Left  -11 ; Right  -12    12/5/22  SPINE AROM:  Thoraco-Lumbar Flexion: 60  Lumbar Extension: 25  Lumbar Side Bending: right 18 left 20  Trunk rotation: right 35 left 35    12/12/22  MMT abdominals 3+/5  MMT back extensors 3+/5    Written Home Exercises Provided: completed.        Education provided re:  Ibeth received verbal and tactile cues to facilitate proper execution of exercises and body mechanics.   No spiritual or educational barriers to learning.    Assessment     Patient has completed therapy prescription and has progressed well with goals.    This is a 77 y.o. female referred to outpatient physical  therapy and presents with a medical diagnosis of Low back pain and demonstrates limitations as described in the problem list. Pt prognosis is Good. Pt will continue to benefit from skilled outpatient physical therapy to address the deficits listed in the problem list, provide pt/family education and to maximize pt's level of independence in the home and community environment.     Goals as follows:  Short Term Goals:   1. Patient will correctly demonstrate independent performance of Home Exercise Program in 1 week. Goal Met.  2. Patient will report decreased low back pain to 4/10 for standing, walking and bending activities.Goal Met.  3. Patient will increase hamstring flexibility 90/90 by 10 degrees to improve functional mobility. Goal Met for Right HS.  4. Patient will increase lumbar ROM by 10 degrees to improve functional mobility. Goal Met.     Long Term Goals: 5 weeks   1. Patient will increase bilateral hip LE strength 4/5 to improve functional activity tolerance. Goal Met.  2. Patient will increase abdominal and back extensor strength to 3/5 to improve functional activity mobility for ADL's. Goal Met.  3. Patient will tolerate 30 minutes or greater of therapeutic exercise with back pain no greater than 2/10 to improve functional activity tolerance. Goal Met.        Plan     Discharge PT services.    Therapist: Saul Lyons, PT  12/12/2022

## 2022-12-12 NOTE — PLAN OF CARE
Outpatient Therapy Discharge Summary     Name: Ibeth Pruitt  Clinic Number: [id number]    Therapy Diagnosis:   Encounter Diagnosis   Name Primary?    Chronic bilateral low back pain without sciatica Yes     Physician: Olesya Díaz FNP  Medical Diagnosis: Low Back Pain  Evaluation Date: 11/2/22  Date of Last visit: 12/12/22  Total Visits Received: 10    Objective:    MANUAL MUSCLE TEST  Right left   Hip flexion MMT number: 4/5 MMT strength: 4/5   Hip abduction MMT strength: 4/5 MMT strength: 4/5   Knee extension MMT strength: 5/5 MMT strength: 5/5   Knee flexion  MMT strength: 5/5 MMT strength: 5/5   Ankle dorsiflexion MMT strength: 5/5 MMT strength: 5/5   Ankle plantar flexion  MMT strength: 5/5 MMT strength: 5/5   Extensor hallucis longus MMT strength: 5/5 MMT strength: 5/5     12/12/22  90/90 HS Flexibility: Left  -11 ; Right  -12    12/5/22  SPINE AROM:  Thoraco-Lumbar Flexion: 60  Lumbar Extension: 25  Lumbar Side Bending: right 18 left 20  Trunk rotation: right 35 left 35    12/12/22  MMT abdominals 3+/5  MMT back extensors 3+/5      Assessment    Ibeth Pruitt has completed therapy prescription and has progressed well with goals.    Goals:  Short Term Goals:   1. Patient will correctly demonstrate independent performance of Home Exercise Program in 1 week. Goal Met.  2. Patient will report decreased low back pain to 4/10 for standing, walking and bending activities.Goal Met.  3. Patient will increase hamstring flexibility 90/90 by 10 degrees to improve functional mobility. Goal Met for Right HS.  4. Patient will increase lumbar ROM by 10 degrees to improve functional mobility. Goal Met.     Long Term Goals: 5 weeks   1. Patient will increase bilateral hip LE strength 4/5 to improve functional activity tolerance. Goal Met.  2. Patient will increase abdominal and back extensor strength to 3/5 to improve functional activity mobility for ADL's. Goal Met.  3. Patient will tolerate 30 minutes or greater of  therapeutic exercise with back pain no greater than 2/10 to improve functional activity tolerance. Goal Met.    Discharge reason: Patient has completed the physician's prescription and Patient has met all of his/her goals    Plan   This patient is discharged from Physical Therapy.      Saul Lyons, PT

## 2023-09-14 ENCOUNTER — HOSPITAL ENCOUNTER (EMERGENCY)
Facility: HOSPITAL | Age: 78
Discharge: HOME | End: 2023-09-14
Payer: MEDICARE

## 2023-09-14 VITALS
SYSTOLIC BLOOD PRESSURE: 136 MMHG | WEIGHT: 135 LBS | RESPIRATION RATE: 18 BRPM | BODY MASS INDEX: 23.05 KG/M2 | DIASTOLIC BLOOD PRESSURE: 72 MMHG | HEIGHT: 64 IN | OXYGEN SATURATION: 97 % | TEMPERATURE: 98 F | HEART RATE: 75 BPM

## 2023-09-14 DIAGNOSIS — M25.551 RIGHT HIP PAIN: Primary | ICD-10-CM

## 2023-09-14 PROCEDURE — 99283 EMERGENCY DEPT VISIT LOW MDM: CPT

## 2023-09-14 PROCEDURE — 63600175 PHARM REV CODE 636 W HCPCS: Performed by: FAMILY MEDICINE

## 2023-09-14 PROCEDURE — 96372 THER/PROPH/DIAG INJ SC/IM: CPT | Performed by: FAMILY MEDICINE

## 2023-09-14 PROCEDURE — 99284 EMERGENCY DEPT VISIT MOD MDM: CPT

## 2023-09-14 RX ORDER — DEXAMETHASONE SODIUM PHOSPHATE 4 MG/ML
4 INJECTION, SOLUTION INTRA-ARTICULAR; INTRALESIONAL; INTRAMUSCULAR; INTRAVENOUS; SOFT TISSUE
Status: COMPLETED | OUTPATIENT
Start: 2023-09-14 | End: 2023-09-14

## 2023-09-14 RX ORDER — VIT C/E/ZN/COPPR/LUTEIN/ZEAXAN 250MG-90MG
1 CAPSULE ORAL
COMMUNITY

## 2023-09-14 RX ORDER — KETOROLAC TROMETHAMINE 30 MG/ML
60 INJECTION, SOLUTION INTRAMUSCULAR; INTRAVENOUS
Status: COMPLETED | OUTPATIENT
Start: 2023-09-14 | End: 2023-09-14

## 2023-09-14 RX ORDER — ASPIRIN 81 MG/1
TABLET ORAL
COMMUNITY

## 2023-09-14 RX ORDER — DEXTROMETHORPHAN HYDROBROMIDE, GUAIFENESIN 5; 100 MG/5ML; MG/5ML
LIQUID ORAL
COMMUNITY

## 2023-09-14 RX ADMIN — DEXAMETHASONE SODIUM PHOSPHATE 4 MG: 4 INJECTION, SOLUTION INTRA-ARTICULAR; INTRALESIONAL; INTRAMUSCULAR; INTRAVENOUS; SOFT TISSUE at 02:09

## 2023-09-14 RX ADMIN — KETOROLAC TROMETHAMINE 60 MG: 30 INJECTION, SOLUTION INTRAMUSCULAR at 02:09

## 2023-09-14 NOTE — ED PROVIDER NOTES
Encounter Date: 9/14/2023       History     Chief Complaint   Patient presents with    Hip Pain    Leg Pain     right     Patient comes in with right leg pain radiating down her sciatic nerve.        Review of patient's allergies indicates:  No Known Allergies  Past Medical History:   Diagnosis Date    Essential (primary) hypertension     Low back pain 08/08/2018    Mixed hyperlipidemia     Spinal stenosis of lumbar region with neurogenic claudication 08/08/2018    Type 2 diabetes mellitus without complications      Past Surgical History:   Procedure Laterality Date    CHOLECYSTECTOMY      HYSTERECTOMY      OOPHORECTOMY       Family History   Problem Relation Age of Onset    Heart disease Mother     Diabetes Mellitus Mother     Prostate cancer Father     Diabetes Mellitus Sister     Breast cancer Sister     Arthritis Sister     Diabetes Mellitus Sister     Arthritis Sister     Diabetes Mellitus Sister     Arthritis Sister     Diabetes Mellitus Sister     Arthritis Sister     Pneumonia Sister     Breast cancer Sister     Arthritis Brother     Diabetes Mellitus Brother     Arthritis Brother     Diabetes Mellitus Brother     Arthritis Brother     Diabetes Mellitus Brother     Arthritis Brother     Arthritis Brother     Pancreatic cancer Brother     Arthritis Maternal Aunt      Social History     Tobacco Use    Smoking status: Never    Smokeless tobacco: Never   Substance Use Topics    Alcohol use: Never    Drug use: Never     Review of Systems   Constitutional: Negative.  Negative for fever.   HENT: Negative.  Negative for sore throat.    Eyes: Negative.    Respiratory: Negative.  Negative for shortness of breath.    Cardiovascular: Negative.  Negative for chest pain.   Gastrointestinal: Negative.  Negative for nausea.   Endocrine: Negative.    Genitourinary: Negative.  Negative for dysuria.   Musculoskeletal: Negative.  Negative for back pain.   Skin: Negative.  Negative for rash.   Allergic/Immunologic: Negative.     Neurological: Negative.  Negative for weakness.   Hematological: Negative.  Does not bruise/bleed easily.   Psychiatric/Behavioral: Negative.     All other systems reviewed and are negative.      Physical Exam     Initial Vitals [09/14/23 1420]   BP Pulse Resp Temp SpO2   136/72 75 18 98.2 °F (36.8 °C) 97 %      MAP       --         Physical Exam    Constitutional: She appears well-developed and well-nourished.   HENT:   Head: Normocephalic and atraumatic.   Right Ear: External ear normal.   Left Ear: External ear normal.   Nose: Nose normal.   Mouth/Throat: Oropharynx is clear and moist.   Eyes: Conjunctivae and EOM are normal. Pupils are equal, round, and reactive to light.   Neck: Neck supple.   Normal range of motion.  Cardiovascular:  Normal rate, regular rhythm, normal heart sounds and intact distal pulses.           Pulmonary/Chest: Breath sounds normal.   Abdominal: Abdomen is soft. Bowel sounds are normal.   Genitourinary:    Vagina and uterus normal.     Musculoskeletal:         General: Normal range of motion.      Cervical back: Normal range of motion and neck supple.     Neurological: She is alert and oriented to person, place, and time. She has normal strength and normal reflexes.   Skin: Skin is warm. Capillary refill takes less than 2 seconds.   Psychiatric: She has a normal mood and affect. Her behavior is normal. Judgment and thought content normal.         Medical Screening Exam   See Full Note    ED Course   Procedures  Labs Reviewed - No data to display       Imaging Results    None          Medications   ketorolac injection 60 mg (60 mg Intramuscular Given 9/14/23 1458)   dexAMETHasone injection 4 mg (4 mg Intramuscular Given 9/14/23 1458)     Medical Decision Making  Risk  Prescription drug management.                          Medical Decision Making:   Initial Assessment:   Patient comes in with sciatic pain.  Differential Diagnosis:   Sciatic pain.  ED Management:  Take meds as  directed      Clinical Impression:   Final diagnoses:  [M25.551] Right hip pain (Primary)        ED Disposition Condition    Discharge Stable          ED Prescriptions    None       Follow-up Information    None          Amado Coello,   09/16/23 1007

## 2023-09-18 ENCOUNTER — HOSPITAL ENCOUNTER (OUTPATIENT)
Dept: RADIOLOGY | Facility: HOSPITAL | Age: 78
Discharge: HOME | End: 2023-09-18
Attending: RADIOLOGY
Payer: MEDICARE

## 2023-09-18 ENCOUNTER — HOSPITAL ENCOUNTER (OUTPATIENT)
Dept: RADIOLOGY | Facility: HOSPITAL | Age: 78
Discharge: HOME | End: 2023-09-18
Payer: MEDICARE

## 2023-09-18 DIAGNOSIS — Z12.31 VISIT FOR SCREENING MAMMOGRAM: ICD-10-CM

## 2023-09-18 DIAGNOSIS — R92.8 ABNORMAL MAMMOGRAM: ICD-10-CM

## 2023-09-18 PROCEDURE — 77067 SCR MAMMO BI INCL CAD: CPT | Mod: TC

## 2023-09-18 PROCEDURE — 76641 US BREAST BILATERAL COMPLETE: ICD-10-PCS | Mod: 26,50,, | Performed by: RADIOLOGY

## 2023-09-18 PROCEDURE — 76641 ULTRASOUND BREAST COMPLETE: CPT | Mod: 26,50,, | Performed by: RADIOLOGY

## 2023-09-18 PROCEDURE — 77067 SCR MAMMO BI INCL CAD: CPT | Mod: 26,,, | Performed by: RADIOLOGY

## 2023-09-18 PROCEDURE — 77067 MAMMO DIGITAL SCREENING BILAT: ICD-10-PCS | Mod: 26,,, | Performed by: RADIOLOGY

## 2023-09-18 PROCEDURE — 76641 ULTRASOUND BREAST COMPLETE: CPT | Mod: TC,50

## 2024-11-05 ENCOUNTER — HOSPITAL ENCOUNTER (OUTPATIENT)
Dept: RADIOLOGY | Facility: HOSPITAL | Age: 79
Discharge: HOME | End: 2024-11-05
Attending: RADIOLOGY
Payer: MEDICARE

## 2024-11-05 DIAGNOSIS — R92.8 ABNORMAL MAMMOGRAM: ICD-10-CM

## 2024-11-05 DIAGNOSIS — Z12.31 VISIT FOR SCREENING MAMMOGRAM: ICD-10-CM

## 2024-11-05 PROCEDURE — 76641 ULTRASOUND BREAST COMPLETE: CPT | Mod: 26,50,, | Performed by: RADIOLOGY

## 2024-11-05 PROCEDURE — 77063 BREAST TOMOSYNTHESIS BI: CPT | Mod: 26,,, | Performed by: RADIOLOGY

## 2024-11-05 PROCEDURE — 76641 ULTRASOUND BREAST COMPLETE: CPT | Mod: TC,50

## 2024-11-05 PROCEDURE — 77067 SCR MAMMO BI INCL CAD: CPT | Mod: 26,,, | Performed by: RADIOLOGY

## 2024-11-05 PROCEDURE — 77063 BREAST TOMOSYNTHESIS BI: CPT | Mod: TC

## 2025-07-09 ENCOUNTER — HOSPITAL ENCOUNTER (EMERGENCY)
Facility: HOSPITAL | Age: 80
Discharge: HOME | End: 2025-07-09
Payer: MEDICARE

## 2025-07-09 VITALS
BODY MASS INDEX: 23.05 KG/M2 | WEIGHT: 135 LBS | TEMPERATURE: 98 F | RESPIRATION RATE: 16 BRPM | HEIGHT: 64 IN | SYSTOLIC BLOOD PRESSURE: 154 MMHG | DIASTOLIC BLOOD PRESSURE: 83 MMHG | HEART RATE: 79 BPM | OXYGEN SATURATION: 99 %

## 2025-07-09 DIAGNOSIS — R05.1 ACUTE COUGH: Primary | ICD-10-CM

## 2025-07-09 PROCEDURE — 99281 EMR DPT VST MAYX REQ PHY/QHP: CPT

## 2025-07-09 PROCEDURE — 99282 EMERGENCY DEPT VISIT SF MDM: CPT | Mod: GF | Performed by: NURSE PRACTITIONER

## 2025-07-10 NOTE — ED PROVIDER NOTES
Encounter Date: 7/9/2025       History     Chief Complaint   Patient presents with    Cough     Onset  2 days ago     Patient presents with intermittent, nonproductive cough over the past week or so.  Denies fever, dyspnea, or other accompanying symptoms.  She is nontoxic appearing.  No change in intake or output.      Review of patient's allergies indicates:  No Known Allergies  Past Medical History:   Diagnosis Date    Essential (primary) hypertension     Low back pain 08/08/2018    Mixed hyperlipidemia     Spinal stenosis of lumbar region with neurogenic claudication 08/08/2018    Type 2 diabetes mellitus without complications      Past Surgical History:   Procedure Laterality Date    CHOLECYSTECTOMY      HYSTERECTOMY      OOPHORECTOMY       Family History   Problem Relation Name Age of Onset    Heart disease Mother      Diabetes Mellitus Mother      Prostate cancer Father      Diabetes Mellitus Sister      Breast cancer Sister      Arthritis Sister      Diabetes Mellitus Sister      Arthritis Sister      Diabetes Mellitus Sister      Arthritis Sister      Diabetes Mellitus Sister      Arthritis Sister      Pneumonia Sister      Breast cancer Sister      Arthritis Brother      Diabetes Mellitus Brother      Arthritis Brother      Diabetes Mellitus Brother      Arthritis Brother      Diabetes Mellitus Brother      Arthritis Brother      Arthritis Brother      Pancreatic cancer Brother      Arthritis Maternal Aunt       Social History[1]  Review of Systems   Constitutional:  Negative for fever.   HENT: Negative.     Respiratory:  Positive for cough. Negative for shortness of breath, wheezing and stridor.    Cardiovascular: Negative.    Gastrointestinal: Negative.    Neurological: Negative.        Physical Exam     Initial Vitals [07/09/25 2109]   BP Pulse Resp Temp SpO2   (!) 154/83 79 16 98 °F (36.7 °C) 99 %      MAP       --         Physical Exam    Nursing note and vitals reviewed.  Constitutional: No distress.    HENT:   Head: Normocephalic and atraumatic.   Eyes: EOM are normal. Pupils are equal, round, and reactive to light.   Neck: Neck supple.   Cardiovascular:  Normal rate, regular rhythm and normal heart sounds.           Pulmonary/Chest: Breath sounds normal. No respiratory distress.   Abdominal: Abdomen is soft. There is no abdominal tenderness.   Musculoskeletal:         General: Normal range of motion.      Cervical back: Neck supple.     Neurological: She is alert. GCS score is 15. GCS eye subscore is 4. GCS verbal subscore is 5. GCS motor subscore is 6.   Skin: Skin is warm and dry. Capillary refill takes less than 2 seconds.         Medical Screening Exam   See Full Note    ED Course   Procedures  Labs Reviewed - No data to display       Imaging Results    None          Medications - No data to display  Medical Decision Making  Patient with intermittent, nonproductive cough x1 week.  Afebrile with stable vital signs.  Respirations even nonlabored with clear lungs.  O2 sat 99% on room air.  No concerning findings on exam.  Recommend treating conservatively.  Drink plenty of water and take Mucinex.  Follow up primary care if symptoms persist.  Return emergency department if acute worsening.                                      Clinical Impression:   Final diagnoses:  [R05.1] Acute cough (Primary)        ED Disposition Condition    Discharge Stable          ED Prescriptions    None       Follow-up Information    None            [1]   Social History  Tobacco Use    Smoking status: Never     Passive exposure: Never    Smokeless tobacco: Never   Substance Use Topics    Alcohol use: Never    Drug use: Never        Alan Almendarez, JONATHAN  07/09/25 2858

## 2025-07-11 ENCOUNTER — TELEPHONE (OUTPATIENT)
Dept: EMERGENCY MEDICINE | Facility: HOSPITAL | Age: 80
End: 2025-07-11
Payer: MEDICARE